# Patient Record
Sex: FEMALE | Race: WHITE | NOT HISPANIC OR LATINO
[De-identification: names, ages, dates, MRNs, and addresses within clinical notes are randomized per-mention and may not be internally consistent; named-entity substitution may affect disease eponyms.]

---

## 2017-01-02 ENCOUNTER — TRANSCRIPTION ENCOUNTER (OUTPATIENT)
Age: 37
End: 2017-01-02

## 2017-01-12 ENCOUNTER — TRANSCRIPTION ENCOUNTER (OUTPATIENT)
Age: 37
End: 2017-01-12

## 2017-08-29 ENCOUNTER — RESULT REVIEW (OUTPATIENT)
Age: 37
End: 2017-08-29

## 2019-02-23 ENCOUNTER — TRANSCRIPTION ENCOUNTER (OUTPATIENT)
Age: 39
End: 2019-02-23

## 2021-03-22 ENCOUNTER — RESULT REVIEW (OUTPATIENT)
Age: 41
End: 2021-03-22

## 2021-11-10 PROBLEM — Z00.00 ENCOUNTER FOR PREVENTIVE HEALTH EXAMINATION: Status: ACTIVE | Noted: 2021-11-10

## 2021-11-11 ENCOUNTER — NON-APPOINTMENT (OUTPATIENT)
Age: 41
End: 2021-11-11

## 2021-11-19 ENCOUNTER — NON-APPOINTMENT (OUTPATIENT)
Age: 41
End: 2021-11-19

## 2021-11-29 ENCOUNTER — APPOINTMENT (OUTPATIENT)
Dept: COLORECTAL SURGERY | Facility: CLINIC | Age: 41
End: 2021-11-29
Payer: COMMERCIAL

## 2021-11-29 VITALS
HEIGHT: 64 IN | WEIGHT: 117 LBS | HEART RATE: 79 BPM | TEMPERATURE: 98.4 F | BODY MASS INDEX: 19.97 KG/M2 | DIASTOLIC BLOOD PRESSURE: 73 MMHG | SYSTOLIC BLOOD PRESSURE: 123 MMHG

## 2021-11-29 DIAGNOSIS — K64.8 OTHER HEMORRHOIDS: ICD-10-CM

## 2021-11-29 PROCEDURE — 46221 LIGATION OF HEMORRHOID(S): CPT

## 2021-11-29 PROCEDURE — 99203 OFFICE O/P NEW LOW 30 MIN: CPT | Mod: 25

## 2021-11-29 PROCEDURE — 46922 EXCISION OF ANAL LESION(S): CPT

## 2021-11-29 NOTE — ASSESSMENT
[FreeTextEntry1] : I had a detailed discussion with the patient regarding optimization of bowel movements with increasing daily fiber and water intake. Both synthetic and dietary fiber recommendations were reviewed. I had strongly counseled the patient regarding the need for increasing water to help improve  bowel function.\par \par I discussed with the patient that improving  bowel function will alleviate  hemorrhoidal symptoms.\par \par Advised return in 4-6 weeks if symptoms are persistent.\par \par I reviewed with the patient that her findings of the anal margin polypoid lesions may be related to HPV.  Recommend follow-up pathology results.  Potential need for long-term surveillance outlined.  Patient reports prior history of HPV.

## 2021-11-29 NOTE — PHYSICAL EXAM
[Manually Reducible] : a manually reducible (grade III) [Normal] : was normal [None] : there was no rectal mass  [de-identified] : inter  cleft and right lateral anal margin- raised polypoid 5-7 mm lesion.  excised. [de-identified] : right posterior internal /external hemorrhoid [FreeTextEntry1] : Medical assistant was present for the entire exam.\par \par Anoscopy was performed for evaluation of the patients rectal bleeding  history .\par The risks, benefits and alternatives were reviewed.\par \par A lighted anoscope was passed into the anal canal and the entire anal mucosal surface was inspected..  \par The findings revealed moderate internal hemorrhoids.\par No masses or lesions were identified.\par \par \par The risks and benefits of rubber band ligation were discussed with the patient including but not limited to bleeding, pain, infection, and the need for future procedures. The anoscope was placed and rubber band ligation was performed of the internal hemorrhoids- RPQ with good result. The patient tolerated the procedure well. Appropriate postprocedure instructions were given to the patient.\par

## 2021-11-29 NOTE — HISTORY OF PRESENT ILLNESS
[FreeTextEntry1] : 42 yo F presents for evaluation of hemorrhoids, referred by Dr. Obregon\par \par Pt reports h/o hemorrhoid for the last few years which has irritated her off and on. She reports intermittent BRB noted on TP w/ some BMs. Admits to occasional itching and burning and has tried OTC hemorrhoid creams w/o noticeable improvement. \par Reports external tissue always present and makes cleaning after BMs difficult. Denies fecal seepage\par \par Seen by Dr. Lovelace 10/23/21 for evaluation of mole and hemorrhoid. Posterior skin tag near coccyx and external hemorrhoid w/ internal component noted. Recommended excisional hemorrhoidectomy and excision of skin tag. Surgical practice wasn't convenient, patient presents for second opinion\par \par BH: once daily\par Occasionally takes Miralax as needed\par Admits to lower intake of dietary fiber since having COVID Feb 2021 and reduced sense of taste/smell. Admits could improve water intake, drinks 32 oz water daily\par Denies use of fiber supplements\par Takes acidophilus daily\par \par Never had a colonoscopy\par Denies [facility] colorectal CA\par No ASA use. Took Motrin for headache

## 2021-12-01 ENCOUNTER — NON-APPOINTMENT (OUTPATIENT)
Age: 41
End: 2021-12-01

## 2021-12-01 ENCOUNTER — TRANSCRIPTION ENCOUNTER (OUTPATIENT)
Age: 41
End: 2021-12-01

## 2022-09-06 ENCOUNTER — TRANSCRIPTION ENCOUNTER (OUTPATIENT)
Age: 42
End: 2022-09-06

## 2022-09-06 ENCOUNTER — NON-APPOINTMENT (OUTPATIENT)
Age: 42
End: 2022-09-06

## 2022-10-10 ENCOUNTER — TRANSCRIPTION ENCOUNTER (OUTPATIENT)
Age: 42
End: 2022-10-10

## 2022-10-28 ENCOUNTER — APPOINTMENT (OUTPATIENT)
Dept: COLORECTAL SURGERY | Facility: CLINIC | Age: 42
End: 2022-10-28